# Patient Record
Sex: FEMALE | Race: OTHER | Employment: FULL TIME | URBAN - NONMETROPOLITAN AREA
[De-identification: names, ages, dates, MRNs, and addresses within clinical notes are randomized per-mention and may not be internally consistent; named-entity substitution may affect disease eponyms.]

---

## 2021-02-08 ENCOUNTER — HOSPITAL ENCOUNTER (INPATIENT)
Age: 21
LOS: 4 days | Discharge: HOME | DRG: 881 | End: 2021-02-12
Attending: EMERGENCY MEDICINE | Admitting: PSYCHIATRY & NEUROLOGY
Payer: MEDICAID

## 2021-02-08 DIAGNOSIS — R45.851 SUICIDAL IDEATION: Primary | ICD-10-CM

## 2021-02-08 DIAGNOSIS — F19.10 POLYSUBSTANCE ABUSE (HCC): ICD-10-CM

## 2021-02-08 LAB
ALBUMIN SERPL-MCNC: 4.1 G/DL (ref 3.5–5.2)
ALP BLD-CCNC: 146 U/L (ref 35–104)
ALT SERPL-CCNC: 51 U/L (ref 5–33)
AMPHETAMINE SCREEN, URINE: NEGATIVE
ANION GAP SERPL CALCULATED.3IONS-SCNC: 9 MMOL/L (ref 7–19)
AST SERPL-CCNC: 34 U/L (ref 5–32)
ATYPICAL LYMPHOCYTE RELATIVE PERCENT: 10 % (ref 0–8)
BACTERIA: NEGATIVE /HPF
BARBITURATE SCREEN URINE: NEGATIVE
BASOPHILS ABSOLUTE: 0.1 K/UL (ref 0–0.2)
BASOPHILS RELATIVE PERCENT: 2 % (ref 0–1)
BENZODIAZEPINE SCREEN, URINE: POSITIVE
BILIRUB SERPL-MCNC: 0.3 MG/DL (ref 0.2–1.2)
BILIRUBIN URINE: NEGATIVE
BLOOD, URINE: ABNORMAL
BUN BLDV-MCNC: 10 MG/DL (ref 6–20)
CALCIUM SERPL-MCNC: 8.8 MG/DL (ref 8.6–10)
CANNABINOID SCREEN URINE: POSITIVE
CHLORIDE BLD-SCNC: 104 MMOL/L (ref 98–111)
CLARITY: CLEAR
CO2: 26 MMOL/L (ref 22–29)
COCAINE METABOLITE SCREEN URINE: POSITIVE
COLOR: YELLOW
CREAT SERPL-MCNC: 0.7 MG/DL (ref 0.5–0.9)
CRYSTALS, UA: ABNORMAL /HPF
EOSINOPHILS ABSOLUTE: 0 K/UL (ref 0–0.6)
EOSINOPHILS RELATIVE PERCENT: 0 % (ref 0–5)
EPITHELIAL CELLS, UA: 2 /HPF (ref 0–5)
ETHANOL: <10 MG/DL (ref 0–0.08)
GFR AFRICAN AMERICAN: >59
GFR NON-AFRICAN AMERICAN: >60
GLUCOSE BLD-MCNC: 81 MG/DL (ref 74–109)
GLUCOSE URINE: NEGATIVE MG/DL
HCG(URINE) PREGNANCY TEST: NEGATIVE
HCT VFR BLD CALC: 38.8 % (ref 37–47)
HEMOGLOBIN: 12.9 G/DL (ref 12–16)
HYALINE CASTS: 0 /HPF (ref 0–8)
IMMATURE GRANULOCYTES #: 0 K/UL
KETONES, URINE: NEGATIVE MG/DL
LEUKOCYTE ESTERASE, URINE: ABNORMAL
LYMPHOCYTES ABSOLUTE: 3.3 K/UL (ref 1.1–4.5)
LYMPHOCYTES RELATIVE PERCENT: 44 % (ref 20–40)
Lab: ABNORMAL
MCH RBC QN AUTO: 28.7 PG (ref 27–31)
MCHC RBC AUTO-ENTMCNC: 33.2 G/DL (ref 33–37)
MCV RBC AUTO: 86.2 FL (ref 81–99)
MONOCYTES ABSOLUTE: 0.3 K/UL (ref 0–0.9)
MONOCYTES RELATIVE PERCENT: 5 % (ref 0–10)
NEUTROPHILS ABSOLUTE: 2.4 K/UL (ref 1.5–7.5)
NEUTROPHILS RELATIVE PERCENT: 39 % (ref 50–65)
NITRITE, URINE: NEGATIVE
OPIATE SCREEN URINE: NEGATIVE
PDW BLD-RTO: 13.4 % (ref 11.5–14.5)
PH UA: 7.5 (ref 5–8)
PLATELET # BLD: 254 K/UL (ref 130–400)
PLATELET SLIDE REVIEW: ADEQUATE
PMV BLD AUTO: 9.1 FL (ref 9.4–12.3)
POTASSIUM REFLEX MAGNESIUM: 4.5 MMOL/L (ref 3.5–5)
PROTEIN UA: NEGATIVE MG/DL
RBC # BLD: 4.5 M/UL (ref 4.2–5.4)
RBC # BLD: NORMAL 10*6/UL
RBC UA: 1 /HPF (ref 0–4)
SARS-COV-2, NAAT: NOT DETECTED
SODIUM BLD-SCNC: 139 MMOL/L (ref 136–145)
SPECIFIC GRAVITY UA: 1.02 (ref 1–1.03)
TOTAL PROTEIN: 7.2 G/DL (ref 6.6–8.7)
UROBILINOGEN, URINE: 1 E.U./DL
WBC # BLD: 6.2 K/UL (ref 4.8–10.8)
WBC UA: 1 /HPF (ref 0–5)

## 2021-02-08 PROCEDURE — 99285 EMERGENCY DEPT VISIT HI MDM: CPT

## 2021-02-08 PROCEDURE — 1240000000 HC EMOTIONAL WELLNESS R&B

## 2021-02-08 PROCEDURE — 85025 COMPLETE CBC W/AUTO DIFF WBC: CPT

## 2021-02-08 PROCEDURE — 84703 CHORIONIC GONADOTROPIN ASSAY: CPT

## 2021-02-08 PROCEDURE — U0002 COVID-19 LAB TEST NON-CDC: HCPCS

## 2021-02-08 PROCEDURE — 80307 DRUG TEST PRSMV CHEM ANLYZR: CPT

## 2021-02-08 PROCEDURE — 36415 COLL VENOUS BLD VENIPUNCTURE: CPT

## 2021-02-08 PROCEDURE — 82077 ASSAY SPEC XCP UR&BREATH IA: CPT

## 2021-02-08 PROCEDURE — 6370000000 HC RX 637 (ALT 250 FOR IP): Performed by: PSYCHIATRY & NEUROLOGY

## 2021-02-08 PROCEDURE — 80053 COMPREHEN METABOLIC PANEL: CPT

## 2021-02-08 PROCEDURE — 81003 URINALYSIS AUTO W/O SCOPE: CPT

## 2021-02-08 RX ORDER — MECOBALAMIN 5000 MCG
5 TABLET,DISINTEGRATING ORAL NIGHTLY PRN
Status: DISCONTINUED | OUTPATIENT
Start: 2021-02-08 | End: 2021-02-12 | Stop reason: HOSPADM

## 2021-02-08 RX ORDER — HYDROXYZINE HYDROCHLORIDE 25 MG/1
25 TABLET, FILM COATED ORAL 3 TIMES DAILY PRN
Status: DISCONTINUED | OUTPATIENT
Start: 2021-02-08 | End: 2021-02-09

## 2021-02-08 RX ORDER — TRAZODONE HYDROCHLORIDE 50 MG/1
50 TABLET ORAL NIGHTLY
Status: DISCONTINUED | OUTPATIENT
Start: 2021-02-08 | End: 2021-02-09

## 2021-02-08 RX ORDER — POLYETHYLENE GLYCOL 3350 17 G/17G
17 POWDER, FOR SOLUTION ORAL DAILY PRN
Status: DISCONTINUED | OUTPATIENT
Start: 2021-02-08 | End: 2021-02-12 | Stop reason: HOSPADM

## 2021-02-08 RX ORDER — ACETAMINOPHEN 325 MG/1
650 TABLET ORAL EVERY 4 HOURS PRN
Status: DISCONTINUED | OUTPATIENT
Start: 2021-02-08 | End: 2021-02-12 | Stop reason: HOSPADM

## 2021-02-08 RX ADMIN — TRAZODONE HYDROCHLORIDE 50 MG: 50 TABLET ORAL at 22:32

## 2021-02-08 RX ADMIN — Medication 5 MG: at 22:32

## 2021-02-08 ASSESSMENT — SLEEP AND FATIGUE QUESTIONNAIRES
AVERAGE NUMBER OF SLEEP HOURS: 8
DO YOU USE A SLEEP AID: NO
DO YOU HAVE DIFFICULTY SLEEPING: NO

## 2021-02-09 PROBLEM — F34.9 PERSISTENT MOOD (AFFECTIVE) DISORDER, UNSPECIFIED (HCC): Status: ACTIVE | Noted: 2021-02-09

## 2021-02-09 PROCEDURE — 1240000000 HC EMOTIONAL WELLNESS R&B

## 2021-02-09 PROCEDURE — 6370000000 HC RX 637 (ALT 250 FOR IP): Performed by: PSYCHIATRY & NEUROLOGY

## 2021-02-09 PROCEDURE — 99223 1ST HOSP IP/OBS HIGH 75: CPT | Performed by: PSYCHIATRY & NEUROLOGY

## 2021-02-09 RX ORDER — HYDROXYZINE HYDROCHLORIDE 25 MG/1
25 TABLET, FILM COATED ORAL 3 TIMES DAILY PRN
Status: DISCONTINUED | OUTPATIENT
Start: 2021-02-09 | End: 2021-02-12 | Stop reason: HOSPADM

## 2021-02-09 RX ORDER — ESCITALOPRAM OXALATE 10 MG/1
5 TABLET ORAL DAILY
Status: DISCONTINUED | OUTPATIENT
Start: 2021-02-09 | End: 2021-02-12 | Stop reason: HOSPADM

## 2021-02-09 RX ORDER — TRAZODONE HYDROCHLORIDE 50 MG/1
50 TABLET ORAL NIGHTLY PRN
Status: DISCONTINUED | OUTPATIENT
Start: 2021-02-09 | End: 2021-02-12 | Stop reason: HOSPADM

## 2021-02-09 RX ADMIN — Medication 5 MG: at 21:19

## 2021-02-09 RX ADMIN — TRAZODONE HYDROCHLORIDE 50 MG: 50 TABLET ORAL at 21:19

## 2021-02-09 RX ADMIN — ESCITALOPRAM OXALATE 5 MG: 10 TABLET ORAL at 14:25

## 2021-02-09 NOTE — PLAN OF CARE
Problem: Anxiety:  Goal: Level of anxiety will decrease  2/9/2021 1553 by Catarino Subramanian   Group Therapy Note     Date: 2/9/2021  Start Time: 1430  End Time:  0709  Number of Participants: 10     Type of Group: Psychotherapy     Wellness Binder Information  Module Name:  emotional wellness  Session Number:  5     Patient's Goal:  obstacles to emotional wellness     Notes:  pt acknowledged negative thinking as an obstacle to emotional wellness.      Status After Intervention:  Improved     Participation Level: Interactive     Participation Quality: Appropriate, Attentive, and Sharing        Speech:  normal        Thought Process/Content: Logical        Affective Functioning: Congruent        Mood: congruent        Level of consciousness:  Alert, Oriented x4, and Attentive        Response to Learning: Able to verbalize current knowledge/experience        Endings: None Reported     Modes of Intervention: Education        Discipline Responsible: Psychoeducational Specialist        Signature:  Catarino Subramanian                 Outcome: Ongoing

## 2021-02-09 NOTE — PLAN OF CARE
Problem: Altered Mood, Depressive Behavior:  Goal: Able to verbalize acceptance of life and situations over which he or she has no control  Description: Able to verbalize acceptance of life and situations over which he or she has no control  2/9/2021 1227 by Nilson Moyer  Outcome: Ongoing  Note:                                                                     Group Therapy Note    Date: 2/9/2021  Start Time: 1045  End Time:  1115  Number of Participants: 13    Type of Group: Cognitive Skills    Wellness Binder Information  Module Name:  [address] [place] [address]  Session Number:  1    Group Goal for Pt: To improve knowledge of practical facts about depression    Notes:  Pt demonstrated improved knowledge of practical facts about depression by actively participating in group activity. Status After Intervention:  Unchanged    Participation Level:  Active Listener and Interactive    Participation Quality: Appropriate and Attentive      Speech:  normal      Thought Process/Content: Logical      Affective Functioning: Congruent      Mood: anxious and depressed      Level of consciousness:  Alert and Oriented x4      Response to Learning: Able to verbalize current knowledge/experience, Able to verbalize/acknowledge new learning, and Progressing to goal      Endings: None Reported    Modes of Intervention: Education      Discipline Responsible: Psychoeducational Specialist      Signature:  Nilson Moyer

## 2021-02-09 NOTE — PLAN OF CARE
Problem: Altered Mood, Depressive Behavior:  Goal: Able to verbalize acceptance of life and situations over which he or she has no control  Description: Able to verbalize acceptance of life and situations over which he or she has no control  Outcome: Ongoing  Note:                                                                     Group Therapy Note    Date: 2/9/2021  Start Time: 1000  End Time:  1030  Number of Participants: 12    Type of Group: Psychoeducation    Wellness Binder Information  Module Name:  Men's Issues  Session Number:  1    Group Goal for Pt: To improve knowledge of effective stress management techniques    Notes:  Pt demonstrated improved knowledge of effective stress management techniques by actively participating in group discussion. Status After Intervention:  Unchanged    Participation Level:  Active Listener    Participation Quality: Appropriate and Attentive      Speech:  normal      Thought Process/Content: Logical      Affective Functioning: Congruent      Mood: anxious and depressed      Level of consciousness:  Alert and Oriented x4      Response to Learning: Able to verbalize current knowledge/experience, Able to verbalize/acknowledge new learning, and Progressing to goal      Endings: None Reported    Modes of Intervention: Education      Discipline Responsible: Psychoeducational Specialist      Signature:  Marcel Lopez
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
## 2021-02-09 NOTE — GROUP NOTE
Group Therapy Note    Date: 2/9/2021    Group Start Time: 1600  Group End Time: 1700  Group Topic: Healthy Living/Wellness    [facility] ADULT I    Chace Todd RN                                                                 Group Therapy Note    Date: 3/10/2020  Start Time: 1600  End Time:  1700  Number of Participants: 10    Type of Group: Healthy Living/Wellness    Patient's Goal:  medication education       Status After Intervention:  Unchanged    Participation Level: Active Listener and Interactive    Participation Quality: Appropriate and Attentive      Speech:  normal      Thought Process/Content: Logical      Affective Functioning: Congruent      Mood: anxious      Level of consciousness:  Alert, Oriented x4, and Attentive      Response to Learning: Able to verbalize current knowledge/experience and Able to verbalize/acknowledge new learning      Endings: None Reported    Modes of Intervention: Education      Discipline Responsible: Registered Nurse      Signature:   Chace Todd RN

## 2021-02-09 NOTE — PLAN OF CARE
Problem: Health Maintenance - Impaired:  Goal: Ability to perform activities of daily living will improve  2/9/2021 1606 by Audie Abernathy  Outcome: Ongoing       Group Therapy Note     Date: 2/9/2021  Start Time: 5410  End Time:  1600  Number of Participants: 10     Type of Group: Recovery     Wellness Binder Information  Module Name:  relapse prevention  Session Number:  3     Patient's Goal:  too much stress can lead to relapse     Notes:  pt acknowledged use of positive coping skills to decrease stress and help prevent relapse.      Status After Intervention:  Improved     Participation Level: Interactive     Participation Quality: Appropriate, Attentive, and Sharing        Speech:  normal        Thought Process/Content: Logical        Affective Functioning: Congruent        Mood: congruent        Level of consciousness:  Alert, Oriented x4, and Attentive        Response to Learning: Able to verbalize current knowledge/experience        Endings: None Reported     Modes of Intervention: Education        Discipline Responsible: Psychoeducational Specialist        Signature:  Audie Abernathy

## 2021-02-09 NOTE — PROGRESS NOTES
Group Therapy Note    Start Time: 800  End Time:  864  Number of Participants: 15    Type of Group: Community Meeting       Patient's Goal:  \" Talking \"      Notes:        Participation Level:  Active Listener       Participation Quality: Appropriate      Thought Process/Content: Logical      Affective Functioning: Congruent      Mood: Calm      Level of consciousness:  Alert      Modes of Intervention: Support      Discipline Responsible: Behavioral Health Tech II      Signature:  Pamela Wiley

## 2021-02-09 NOTE — PROGRESS NOTES
[facility] ADULT INITIAL INTAKE ASSESSMENT     2/8/21    Leslye Steinberg ,a 22 y.o. female, presents to the ED for a psychiatric assessment. ED Arrival time: 1800  ED physician: [address]  [facility] Notification time:   [facility] Assessment start time:   Psychiatrist call time:   Spoke with Dr. Awa Gil    Patient is referred by: car    Reason for visit to ED - Presenting problem:     PT states reason for ED visit, \"im feeling down lately. I have been to therapy twice. She thinks I have a eating disorder. Pt says she eats and then throws up.pt says she weighs herself evry hour. Pt says her main stressor is her weight. My job is really hard and I hate it. I work nights, so im not able to sleep. Pt says she is suicidal with thoughts of overdose. Pt denies being on any medications. I live with my aunt , my mother kicked me out. she picks men over her children. im really insignificant in this world. Pt denies past psych history. Pt denies avh, hi. Pt says she Is using xanax and adderall that isnt prescribed to her.      Duration of symptoms: month     Current Stressors: family    C-SSRS Completed: yes    SI:  has   Plan: yes   Past SI attempts: no   If yes, when and how many times:  Describe suicide attempts:   HI: denies  If yes describe:   Delusions: denies  If yes describe:   Hallucinations: denies   If yes describe:   Risk of Harm to self: Self injurious/self mutilation behaviorsno   If yes explain:   Was it within the past 6 months: no   Risk of Harm to others: no   If yes explain:   Was it within the past 6 months: no   Trauma History:  Anxiety 1-10:  10  Explain if increased:   Depression 1-10:  8  Explain if increased:   Level of function outside hospital decreased: no   If yes explain:       Psychiatric Hospitalizations: No   Where & When:   Outpatient Psychiatric Treatment:    Family History:    Family history of mental illness: no   \"Depression\",\"Anxiety\",\"Bipolar\",\"Schizophrenia\",\"Borderline\",\"ADHD\"} Family members with suicide attempt: no   If yes explain (attempted or completed):    Substance Abuse History:     SBIRT Completed: yes  Brief Intervention completed if needed:  (Yes/No)    Current ETOH LEVELS: <10    ETOH Usage:     Amount drinking daily: occasional, social use    Date of last drink:   Longest period of sobriety:    Substance/Chemical Abuse/Recreational Drug History:  Substance used: marijuana  Date of last substance use: today  Tobacco Use: no   History of rehab treatment:  How many times in rehab:  Last time in rehab:  Family history of substance abuse:    Opiates: It was discussed with pt they would not be receiving opiates unless they were within 3 days post surgery/acute injury. Patient voiced understanding and agreed. Psychiatric Review Of Systems:     Recent Sleep changes: yes   Recent appetite changes: no   Recent weight changes/Pounds gained (+) or lost (-): no      Medical History:     Medical Diagnosis/Issues:   CT today in ED:no  Use of 02 or CPAP: no  Ambulatory: yes  Independent or Need assistance with Self Care:     PCP: No primary care provider on file. Current Medications:   Scheduled Meds: No current facility-administered medications for this encounter. No current outpatient medications on file. Mental Status Evaluation:     Appearance:  age appropriate   Behavior:  Within Normal Limits   Speech:  normal volume   Mood:  anxious   Affect:  normal   Thought Process:  within normal limits   Thought Content:  suicidal   Sensorium:  person, place and time/date   Cognition:  grossly intact   Insight:  good       Collateral Information:     Name:   Relationship:   Phone Number:   Collateral: refused    Current living arrangement: with aunt   Current Support System: friends   Employment: [employer] in [employer]     Disposition:     Choose one of the options below for disposition:     1.  Decision to admit to [facility]    If yes, which unit Adult or Geriatric Unit:  Adult Is patient voluntary: yes  If no has a 72 hold been initiated:   Admission Diagnosis: SI    Does the patient have a guardian or Medical POA:   Has the guardian been notified or Medical POA:       2. Decision to Discharge:   Does not meet criteria for acceptance to   unit due to:     3. Transferred:       Patient was transferred due to:      Other follow up information provided:      Pretty Hurt RN

## 2021-02-09 NOTE — PROGRESS NOTES
[facility] Admission From ED  Nursing Admission Note    Admission Type: Voluntary    Reason for Admission: Tanya Garcia 21 presents to ED si with plan to overdose. pt also says she has an eating disorder    There is no problem list on file for this patient. Pt admitted from Dr. Chalo Pardo care in ED to [address] [facility] room 0601/601-01. Arrived on unit via [facility] with RN and security  escort. Body assessment completed by Katie Ramirez with no contraband discovered. All tubes, lines, and drains were appropriately discontinued by ED staff prior to pt transfer to [facility]. Pt belongings and valuables inventoried and cataloged, stored per policy. Pt oriented to surroundings, program expectations, and copy pt rights given. Received admit packet: [address], Visitation Info, Fall Prevention, Restraints Info. Consents reviewed, signed Pt Rights, Handbook Acceptance, Visit/Call Acceptance, PHI Release, Social Info Release, and Treatment Agreement. Pt verbalizes understanding. Pt is a smoker? no Pt offered Nicotine patch yes,   Pt refused Nicotine patch? yes Patient provided education on Covid-19 and the importance of reporting any respiratory symptoms, headache, body aches or cough to the nursing staff as well as  frequent hand washing, social distancing and wearing a mask while on the unit? yes,  patient provided mask? yes,  patient refused mask? no Patient verbalized understanding? yes,     Identifies stressors. Eating disorder. Family problems. Pt stated she lived with an Aunt but said she is just one of her mother's friends,she does not know where her mother is and does not know her father. Worries about weight it causes her stress.     Status and Exam  Normal: Yes  Affect: Appropriate  Level of Consciousness: Alert  Mood:Normal: No  Mood: Depressed, Anxious  Motor Activity:Normal: Yes  Interview Behavior: Cooperative  Attention:Normal: Yes  Thought Content:Normal: Yes  Hallucinations: None  Delusions: No  Memory:Normal: Yes

## 2021-02-09 NOTE — ED NOTES
[facility] at pt bedside for mental health evaluation.      Seb Mckeon, [place]  02/08/21 1950

## 2021-02-09 NOTE — PROGRESS NOTES
Requirement Note     SW met with pt to complete Psychosocial and CSSR-S on this date. Patients long and short term goals discussed. Patient voiced understanding. Treatment plan sheet signed. Patient verbalized understanding of the treatment plan. Patient participated in goals and objectives of the treatment plan. Patient completed safety plan with , patient received copy of plan, and original was placed into patient's chart. SW explained treatment goals with pt. Decreasing depression and anxiety by improvement of positive coping patterns was discussed. Pt acknowledged understanding of treatment goals and signed treatment plan signature sheet. In the last 6 months has the pt been a danger to self: YES  In the last 6 months has the pt been a danger to others: NO  Legal Guardian/POA: NO     Provided patient with [employer] handout entitled \"Quitting Smoking. \"  Reviewed handout with patient: addressing dangers of smoking, developing coping skills, and providing basic information about quitting. Patient received all components practical counseling of tobacco practical counseling during the hospital stay.

## 2021-02-09 NOTE — ED PROVIDER NOTES
[facility]  eMERGENCY dEPARTMENT eNCOUnter      Pt Name: Frank George  MRN: [id number]  [place] 2000  Date of evaluation: 2/8/2021  Provider: Kavon Jain MD    CHIEF COMPLAINT       Chief Complaint   Patient presents with    Suicidal     presents to ED with c/o SI without a plan sent by PCP. HISTORY OF PRESENT ILLNESS   (Location/Symptom, Timing/Onset,Context/Setting, Quality, Duration, Modifying Factors, Severity)  Note limiting factors. Frank George is a 22 y.o. female who presents to the emergency department      The history is provided by the patient. Mental Health Problem  Presenting symptoms: depression and suicidal thoughts (plan of OD)    Patient accompanied by: no one. Duration:  1 month  Timing:  Constant  Progression:  Unchanged  Chronicity:  New  Context: not alcohol use, not drug abuse and not stressful life event    Treatment compliance:  Untreated  Risk factors: no hx of mental illness and no hx of suicide attempts        NursingNotes were reviewed. REVIEW OF SYSTEMS    (2-9 systems for level 4, 10 or more for level 5)     Review of Systems   Psychiatric/Behavioral: Positive for suicidal ideas (plan of OD). All other systems reviewed and are negative. Except as noted above the remainder of the review of systems was reviewed and negative. PAST MEDICAL HISTORY   No past medical history on file. SURGICALHISTORY     No past surgical history on file. CURRENT MEDICATIONS       Previous Medications    No medications on file       ALLERGIES     Patient has no known allergies. FAMILY HISTORY     No family history on file.        SOCIAL HISTORY       Social History     Socioeconomic History    Marital status: Single     Spouse name: Not on file    Number of children: Not on file    Years of education: Not on file    Highest education level: Not on file   Occupational History    Not on file   Social Needs  Financial resource strain: Not on file    Food insecurity     Worry: Not on file     Inability: Not on file   [employer] needs     Medical: Not on file     Non-medical: Not on file   Tobacco Use    Smoking status: Not on file   Substance and Sexual Activity    Alcohol use: Not on file    Drug use: Not on file    Sexual activity: Not on file   Lifestyle    Physical activity     Days per week: Not on file     Minutes per session: Not on file    Stress: Not on file   Relationships    Social connections     Talks on phone: Not on file     Gets together: Not on file     Attends Jain service: Not on file     Active member of club or organization: Not on file     Attends meetings of clubs or organizations: Not on file     Relationship status: Not on file    Intimate partner violence     Fear of current or ex partner: Not on file     Emotionally abused: Not on file     Physically abused: Not on file     Forced sexual activity: Not on file   Other Topics Concern    Not on file   Social History Narrative    Not on file       SCREENINGS      @BIND([id number])@      PHYSICAL EXAM    (up to 7 for level 4, 8 or more for level 5)     ED Triage Vitals [02/08/21 1813]   BP Temp Temp Source Pulse Resp SpO2 Height Weight   132/77 98.2 °F (36.8 °C) Oral 90 16 99 % 5' 6\" (1.676 m) 166 lb (75.3 kg)       Physical Exam  Vitals signs and nursing note reviewed. Constitutional:       General: She is not in acute distress. Appearance: Normal appearance. She is normal weight. She is not ill-appearing, toxic-appearing or diaphoretic. HENT:      Head: Atraumatic. Nose: Nose normal.      Mouth/Throat:      Pharynx: Oropharynx is clear. Eyes:      Conjunctiva/sclera: Conjunctivae normal.   Neck:      Musculoskeletal: Normal range of motion and neck supple. Cardiovascular:      Rate and Rhythm: Normal rate and regular rhythm. Heart sounds: Normal heart sounds.    Pulmonary: Effort: Pulmonary effort is normal.      Breath sounds: Normal breath sounds. Musculoskeletal:      Right lower leg: No edema. Left lower leg: No edema. Skin:     General: Skin is warm and dry. Neurological:      General: No focal deficit present. Mental Status: She is alert and oriented to person, place, and time. Cranial Nerves: No cranial nerve deficit.    Psychiatric:         Mood and Affect: Mood normal.         Behavior: Behavior normal.         DIAGNOSTIC RESULTS     EKG: All EKG's are interpreted by the Emergency Department Physician who either signs or Co-signsthis chart in the absence of a cardiologist.        RADIOLOGY:   Creed Hawking such as CT, Ultrasound and MRI are read by the radiologist. Plain radiographic images are visualized and preliminarily interpreted by the emergency physician with the below findings:        Interpretation per the Radiologist below, if available at the time ofthis note:    No orders to display         ED BEDSIDE ULTRASOUND:   Performed by ED Physician - none    LABS:  Labs Reviewed   CBC WITH AUTO DIFFERENTIAL - Abnormal; Notable for the following components:       Result Value    MPV 9.1 (*)     Neutrophils % 39.0 (*)     Lymphocytes % 44.0 (*)     Basophils % 2.0 (*)     Atypical Lymphocytes Relative 10 (*)     All other components within normal limits   COMPREHENSIVE METABOLIC PANEL W/ REFLEX TO MG FOR LOW K - Abnormal; Notable for the following components:    Alkaline Phosphatase 146 (*)     ALT 51 (*)     AST 34 (*)     All other components within normal limits   URINE RT REFLEX TO CULTURE - Abnormal; Notable for the following components:    Blood, Urine MODERATE (*)     Leukocyte Esterase, Urine TRACE (*)     All other components within normal limits   URINE DRUG SCREEN - Abnormal; Notable for the following components:    Benzodiazepine Screen, Urine Positive (*)     Cannabinoid Scrn, Ur Positive (*) Cocaine Metabolite Screen, Urine Positive (*)     All other components within normal limits   MICROSCOPIC URINALYSIS - Abnormal; Notable for the following components:    Bacteria, UA NEGATIVE (*)     Crystals, UA NEG (*)     All other components within normal limits   ETHANOL   PREGNANCY, URINE   COVID-19       All other labs were within normal range or not returned as of this dictation. EMERGENCY DEPARTMENT COURSE and DIFFERENTIAL DIAGNOSIS/MDM:   Vitals:    Vitals:    02/08/21 1813   BP: 132/77   Pulse: 90   Resp: 16   Temp: 98.2 °F (36.8 °C)   TempSrc: Oral   SpO2: 99%   Weight: 166 lb (75.3 kg)   Height: 5' 6\" (1.676 m)           MDM  Number of Diagnoses or Management Options  Polysubstance abuse ([address]  Suicidal ideation  Diagnosis management comments: Psych sees/accepts. CRITICAL CARE TIME   Total Critical Care time was 0 minutes, excluding separately reportable procedures. There was a high probability of clinically significant/lifethreatening deterioration in the patient's condition which required my urgent intervention. CONSULTS:  None    PROCEDURES:  Unless otherwise noted below, none     Procedures    FINAL IMPRESSION      1. Suicidal ideation    2. Polysubstance abuse ([address]          DISPOSITION/PLAN   DISPOSITION        PATIENT REFERRED TO:  No follow-up provider specified.     DISCHARGE MEDICATIONS:  New Prescriptions    No medications on file          (Please note that portions of this note were completed with a voice recognition program.  Efforts were made to edit the dictations but occasionally words are mis-transcribed.)    Osmar Hearn MD (electronically signed)  Attending Emergency Physician          Omsar Hearn MD  02/08/21 8634

## 2021-02-09 NOTE — SUICIDE SAFETY PLAN
SAFETY PLAN    A suicide Safety Plan is a document that supports someone when they are having thoughts of suicide. Warning Signs that indicate a suicidal crisis may be developing: What (situations, thoughts, feelings, body sensations, behaviors, etc.) do you experience that lets you know you are beginning to think about suicide? 1. Taking a lot of xanax  2. More anxious about food  3. shaking    Internal Coping Strategies:  What things can I do (relaxation techniques, hobbies, physical activities, etc.) to take my mind off my problems without contacting another person? 1. Watching tv   2. Listening to music  3. Personal work outs    People and social settings that provide distraction: Who can I call or where can I go to distract me? 1. Name: Tessy Pineda  Phone: [phone]  2. Name: Sera Reagan  Phone:     3. Place: my job [place]ella            4. Place:      People whom I can ask for help: Who can I call when I need help - for example, friends, family, clergy, someone else? 1. Name: Mimi Granados                Phone: [phone]  2. Name: Sera Reagan  Phone:    3. [facility]  Phone:       Professionals or [address] [place], [place] [address]vd I can contact during a crisis: Who can I call for help - for example, my doctor, my psychiatrist, my psychologist, a mental health provider, a suicide hotline? 1. Clinician Name: [id number]   Phone: [phone]      Clinician Pager or Emergency Contact #: [phone]    4. Clinician Name: [address]   Phone: [phone]      Clinician Pager or Emergency Contact #: [phone]    8. Suicide Prevention Lifeline: 0-449-048-TALK (6696)    4.  [address] [place] [address] Emergency Services -  for example, [address] suicide hotline, [facility] Hotline: 211      Emergency Services Address: [facility] [address]      Emergency Services Phone: [phone] Making the environment safe: How can I make my environment (house/apartment/living space) safer? For example, can I remove guns, medications, and other items? 1. Remove weapons from the home  2.  Remove extra medication from the home      The One thing that is most important to me and worth living for is:    My Aunt

## 2021-02-09 NOTE — PLAN OF CARE
Problem: Anxiety:  Goal: Level of anxiety will decrease  Description: Level of anxiety will decrease  Outcome: Ongoing     Problem: Discharge Planning:  Goal: Discharged to appropriate level of care  Description: Discharged to appropriate level of care  Outcome: Ongoing     Problem: Health Maintenance - Impaired:  Goal: Ability to perform activities of daily living will improve  Description: Ability to perform activities of daily living will improve  Outcome: Ongoing  Goal: Able to sleep without medication for appropriate length of time  Description: Able to sleep without medication for appropriate length of time  Outcome: Ongoing  Goal: Maintenance of adequate nutrition will improve  Description: Maintenance of adequate nutrition will improve  Outcome: Ongoing     Problem: Mood - Altered:  Goal: Mood stable  Description: Mood stable  Outcome: Ongoing     Problem: Self-Esteem - Low:  Goal: Demonstrates positive self-esteem  Description: Demonstrates positive self-esteem  Outcome: Ongoing     Problem: Cerebrospinal Fluid Leakage - Risk Of:  Goal: Demonstration of organized thought processes  Description: Demonstration of organized thought processes  Outcome: Ongoing     Problem: Violence - Risk of, Self/Other-Directed:  Goal: Knowledge of developmental care interventions  Description: Absence of violence  Outcome: Ongoing

## 2021-02-10 LAB
ALBUMIN SERPL-MCNC: 4.1 G/DL (ref 3.5–5.2)
ALP BLD-CCNC: 116 U/L (ref 35–104)
ALT SERPL-CCNC: 36 U/L (ref 5–33)
ANION GAP SERPL CALCULATED.3IONS-SCNC: 7 MMOL/L (ref 7–19)
AST SERPL-CCNC: 26 U/L (ref 5–32)
BILIRUB SERPL-MCNC: 0.5 MG/DL (ref 0.2–1.2)
BUN BLDV-MCNC: 7 MG/DL (ref 6–20)
CALCIUM SERPL-MCNC: 8.9 MG/DL (ref 8.6–10)
CHLORIDE BLD-SCNC: 107 MMOL/L (ref 98–111)
CHOLESTEROL, TOTAL: 169 MG/DL (ref 160–199)
CO2: 26 MMOL/L (ref 22–29)
CREAT SERPL-MCNC: 0.5 MG/DL (ref 0.5–0.9)
GFR AFRICAN AMERICAN: >59
GFR NON-AFRICAN AMERICAN: >60
GLUCOSE BLD-MCNC: 81 MG/DL (ref 74–109)
HBA1C MFR BLD: 4.7 % (ref 4–6)
HDLC SERPL-MCNC: 35 MG/DL (ref 65–121)
LDL CHOLESTEROL CALCULATED: 100 MG/DL
POTASSIUM SERPL-SCNC: 4.1 MMOL/L (ref 3.5–5)
SODIUM BLD-SCNC: 140 MMOL/L (ref 136–145)
TOTAL PROTEIN: 6.9 G/DL (ref 6.6–8.7)
TRIGL SERPL-MCNC: 169 MG/DL (ref 0–149)
TSH REFLEX FT4: 0.89 UIU/ML (ref 0.35–5.5)
VITAMIN B-12: 447 PG/ML (ref 211–946)
VITAMIN D 25-HYDROXY: 7.2 NG/ML

## 2021-02-10 PROCEDURE — 99232 SBSQ HOSP IP/OBS MODERATE 35: CPT | Performed by: PSYCHIATRY & NEUROLOGY

## 2021-02-10 PROCEDURE — 1240000000 HC EMOTIONAL WELLNESS R&B

## 2021-02-10 PROCEDURE — 82306 VITAMIN D 25 HYDROXY: CPT

## 2021-02-10 PROCEDURE — 6370000000 HC RX 637 (ALT 250 FOR IP): Performed by: PSYCHIATRY & NEUROLOGY

## 2021-02-10 PROCEDURE — 36415 COLL VENOUS BLD VENIPUNCTURE: CPT

## 2021-02-10 PROCEDURE — 82607 VITAMIN B-12: CPT

## 2021-02-10 PROCEDURE — 80053 COMPREHEN METABOLIC PANEL: CPT

## 2021-02-10 PROCEDURE — 84443 ASSAY THYROID STIM HORMONE: CPT

## 2021-02-10 PROCEDURE — 83036 HEMOGLOBIN GLYCOSYLATED A1C: CPT

## 2021-02-10 PROCEDURE — 80061 LIPID PANEL: CPT

## 2021-02-10 RX ORDER — CEFDINIR 300 MG/1
300 CAPSULE ORAL 2 TIMES DAILY
Status: ON HOLD | COMMUNITY
End: 2021-02-12 | Stop reason: HOSPADM

## 2021-02-10 RX ORDER — ERGOCALCIFEROL 1.25 MG/1
50000 CAPSULE ORAL WEEKLY
Status: DISCONTINUED | OUTPATIENT
Start: 2021-02-10 | End: 2021-02-12 | Stop reason: HOSPADM

## 2021-02-10 RX ADMIN — ESCITALOPRAM OXALATE 5 MG: 10 TABLET ORAL at 08:51

## 2021-02-10 RX ADMIN — HYDROXYZINE HYDROCHLORIDE 25 MG: 25 TABLET, FILM COATED ORAL at 21:50

## 2021-02-10 RX ADMIN — TRAZODONE HYDROCHLORIDE 50 MG: 50 TABLET ORAL at 21:50

## 2021-02-10 RX ADMIN — ERGOCALCIFEROL 50000 UNITS: 1.25 CAPSULE ORAL at 10:17

## 2021-02-10 RX ADMIN — ACETAMINOPHEN 650 MG: 325 TABLET ORAL at 10:48

## 2021-02-10 ASSESSMENT — PAIN SCALES - GENERAL
PAINLEVEL_OUTOF10: 4
PAINLEVEL_OUTOF10: 1

## 2021-02-10 ASSESSMENT — PAIN - FUNCTIONAL ASSESSMENT: PAIN_FUNCTIONAL_ASSESSMENT: ACTIVITIES ARE NOT PREVENTED

## 2021-02-10 NOTE — BH NOTE
Group Therapy Note    Date: 2/10/2021  Start Time: 1330  End Time:  1400  Number of Participants: 11    Type of Group: Spirituality     Wellness Binder Information  Module Name:  Mindfulness  Session Number:      Patient's Goal:  Skills in meditation and relaxation    Notes:      Status After Intervention:  Improved    Participation Level:  Active Listener and Interactive    Participation Quality: Appropriate, Attentive and Sharing      Speech:  normal      Thought Process/Content:       Affective Functioning: Congruent      Mood: euthymic, calm      Level of consciousness:  Oriented x4 and Attentive      Response to Learning: Able to verbalize current knowledge/experience and Capable of insight      Endings:     Modes of Intervention: Education, Socialization and Activity      Discipline Responsible:       Signature:  Loretta Stone  [facility]

## 2021-02-10 NOTE — PLAN OF CARE
Problem: Health Maintenance - Impaired:  Goal: Ability to perform activities of daily living will improve  Description: Ability to perform activities of daily living will improve  Outcome: Ongoing                                                                       Group Therapy Note    Date: 2/10/2021  Start Time: 1000  End Time:  2175  Number of Participants: 11    Type of Group: Psychotherapy    Patient's Goal: Patient will process emotions and actions and how to relate to other or their with others. Patient discussed open communication and feelings and emotions. Notes:  Patient attended process group as scheduled, patient identified a issue to work on today regarding how they will interact and relate to others. Status After Intervention:  Improved    Participation Level:  Active Listener    Participation Quality: Appropriate, Attentive, and Sharing      Speech:  normal      Thought Process/Content: Logical      Affective Functioning: Congruent      Mood: euthymic      Level of consciousness:  Alert      Response to Learning: Able to verbalize current knowledge/experience      Endings: None Reported    Modes of Intervention: Education, Support, and Socialization      Discipline Responsible: /Counselor      Signature:  Stephanie Banda, [facility]

## 2021-02-10 NOTE — PROGRESS NOTES
Group Therapy Note    Start Time: 800  End Time:  728  Number of Participants: 10    Type of Group: Community Meeting       Patient's Goal:  \"anxiety\"      Notes:      Participation Level:  Active Listener       Participation Quality: Appropriate      Thought Process/Content: Logical      Affective Functioning: Congruent      Mood: calm      Level of consciousness:  Alert      Modes of Intervention: Support      Discipline Responsible: Behavioral Health Tech II      Signature:  Lencho Cobian

## 2021-02-10 NOTE — PROGRESS NOTES
BHI Daily Shift Assessment  Nursing Progress Note    [address] [facility] "[employer]    Room: 601  Name: Anil Campo  Age: 22   Gender: F   Dx: Suicidal thought  Precautions: Suicide  Target Symptoms: suicidal thought   Accu-Chek: no  Sleep Quality: 7 hrs  SI: no  HI: no  AVH: no  ADLs: yes  Speech: clear  Depression: 0  Anxiety: 0  Participation Quality: good  Appetite: 75%  Respiratory symptoms: none  Headache: none  Body aches: none  Fever: none  Cough: none  Patients encouraged to wear masks / wash hands frequently / practice social distancing: yes  Visitation: n/a  Complaints: none    Notes: Patient calm and cooperative, appearance clean, thought organized, alert/oriented, activities and self care done.     Signature: Sánchez Vargas RN

## 2021-02-10 NOTE — PROGRESS NOTES
Department of Psychiatry  Attending Progress Note     Chief complaint: \"I'm better\"    SUBJECTIVE:   Chart reviewed, discussed with the team.  No major issues overnight. Patient isolates to self. She went to the afternoon group but did not participate. No issues with sleep and appetite. Collateral obtained from mother who states patient has been drinking since November. She had marijuana possession charge which was dismissed. She recently changed the beneficiary of her life insurance from her mother to aunt. Patient is observed in her room this morning. Presents with brighter affect. Smiled. More talkative. States she is doing better. Denies suicidal ideation. Reports good sleep and appetite. No side effects from medication so far. Admits to drinking alcohol with friends and \"occasionally using cocaine and smoking marijuana daily\". States at times she also takes Xanax. \"I get high with some friends. \"  Provider counseled her on the harmful effects of substances on her physical and mental health. Discussed elevated liver enzymes. Patient states \"I am not addicted. I can stop doing it anytime. I know it's bad for me. \"  The writer encouraged her to be more open about her issues. Reassured her that all the information remains confidential.  Lupe Mejias over the coping skills. Encouraged participation in group therapy and recreational activities. OBJECTIVE    Physical  Wt Readings from Last 3 Encounters:   02/08/21 164 lb 9.6 oz (74.7 kg)     Temp Readings from Last 3 Encounters:   02/09/21 97.3 °F (36.3 °C) (Temporal)     BP Readings from Last 3 Encounters:   02/09/21 (!) 124/58     Pulse Readings from Last 3 Encounters:   02/09/21 72        Review of Systems: 14-point review of systems negative except as described above    Mental Status Examination:   Appearance: Stated age. Long hair, glasses. Gait stable. No abnormal movements or tremor.   Behavior: Calm, cooperative, smiled Speech: Normal in tone, volume, and quality. No slurring, dysarthria or pressured speech noted. Mood: \"Better \"   Affect: Mood congruent. Thought Process: Appears linear. Thought Content: Denies suicidal or homicidal ideation. No overt delusions or paranoia appreciated. Perceptions: Denies auditory or visual hallucinations at present time. Not responding to internal stimuli. Concentration: Intact. Orientation: to person, place, date, and situation. Language: Intact. Fund of information: Intact. Memory: Recent and remote appear intact. Neurovegitative: Fair appetite and sleep. Insight: Limited. Judgment: Limited.     Data  Lab Results   Component Value Date    WBC 6.2 02/08/2021    HGB 12.9 02/08/2021    HCT 38.8 02/08/2021    MCV 86.2 02/08/2021     02/08/2021      Lab Results   Component Value Date     02/10/2021    K 4.1 02/10/2021     02/10/2021    CO2 26 02/10/2021    BUN 7 02/10/2021    CREATININE 0.5 02/10/2021    GLUCOSE 81 02/10/2021    CALCIUM 8.9 02/10/2021    PROT 6.9 02/10/2021    LABALBU 4.1 02/10/2021    BILITOT 0.5 02/10/2021    ALKPHOS 116 (H) 02/10/2021    AST 26 02/10/2021    ALT 36 (H) 02/10/2021    LABGLOM >60 02/10/2021    GFRAA >59 02/10/2021       Medications    Current Facility-Administered Medications:     vitamin D (ERGOCALCIFEROL) capsule 50,000 Units, 50,000 Units, Oral, Weekly, Alvarez Alcantara MD    escitalopram (LEXAPRO) tablet 5 mg, 5 mg, Oral, Daily, Alvarez Alcantara MD, 5 mg at 02/10/21 0851    traZODone (DESYREL) tablet 50 mg, 50 mg, Oral, Nightly PRN, Alvarez Alcantara MD, 50 mg at 02/09/21 2119    hydrOXYzine (ATARAX) tablet 25 mg, 25 mg, Oral, TID PRN, Alvarez Alcantara MD    acetaminophen (TYLENOL) tablet 650 mg, 650 mg, Oral, Q4H PRN, Jatin Aaron MD    polyethylene glycol [facility]) packet 17 g, 17 g, Oral, Daily PRN, Jatin Aaron MD   melatonin disintegrating tablet 5 mg, 5 mg, Oral, Nightly PRN, Sandeep Dykes MD, 5 mg at 02/09/21 2111    ASSESSMENT AND PLAN  DSM 5 DIAGNOSIS  Impression  Social anxiety disorder  Eating disorder unspecified   Cluster B traits  Alcohol use disorder  Stimulant use disorder  Cannabis use disorder  Sedative use disorder  Transaminitis  Elevated alkaline phosphatase  Vitamin D deficiency    Brighter affect. Insight remains poor. Continue to observe. Plan:   1. Psychiatric Medications:   Continue current psychotropic medications as recommended. Monitor for side effects. The risks, benefits, side effects, indications, contraindications, alternatives and adverse effects of the medications have been discussed with patient. 2. Continue to provide supportive psychotherapy. Encourage socialization and participation in recreational activities. Work on coping skills. 3. Medical Issues:    Continue medical monitoring by Dr. Jaquelin Delacruz and associates. Vitamin supplementation. Monitor liver function. 4. Disposition:     to provide outpatient resources and facilitate disposition.      Amount of time spent with patient:      35 minutes with greater than 50 % of the time spent in counseling and collaboration of care

## 2021-02-10 NOTE — H&P
HISTORY and PHYSICAL      CHIEF COMPLAINT:  Depression, SI    Reason for Admission:  Depression, SI    History Obtained From:  Patient, chart    HISTORY OF PRESENT ILLNESS:      The patient is a 22 y.o. female who is admitted to the [place] [address] unit with worsening mood issues. She has no c/o CP or SOA. No abdominal pain or N/V. No dysuria. No new pain issues. No fevers. Past Medical History:    No past medical history on file. Past Surgical History:    No past surgical history on file. Medications Prior to Admission:    No medications prior to admission. Allergies:  Patient has no known allergies. Social History:   TOBACCO:   has no history on file for tobacco.  ETOH:   has no history on file for alcohol. DRUGS:   has no history on file for drug. MARITAL STATUS:  single  OCCUPATION:  Working at [employer]  Patient currently lives with family       Family History:   No family history on file. REVIEW OF SYSTEMS:  Constitutional: neg  CV: neg  Pulmonary: neg  GI: neg  : neg  Psych: depression, SI  Neuro: neg  Skin: neg  MusculoSkeletal: neg  HEENT: neg  Joints: neg    Vitals:  BP (!) 124/58   Pulse 72   Temp 97.3 °F (36.3 °C) (Temporal)   Resp 16   Ht 5' 6\" (1.676 m)   Wt 164 lb 9.6 oz (74.7 kg)   SpO2 98%   BMI 26.57 kg/m²     PHYSICAL EXAM:  Gen: NAD, alert  HEENT: WNL  Lymph: no LAD  Neck: no JVD or masses  Chest: CTA bilat  CV: RRR  Abdomen: NT/ND  Extrem: no C/C/E  Neuro: non focal  Skin: no rashes  Joints: no redness    DATA:  I have reviewed the admission labs and imaging tests.     ASSESSMENT AND PLAN:      Active Problems:    Persistent mood (affective) disorder, unspecified, SI--follow with Psych    Polysubstance Abuse    Elevated LFT's      Annika Ahumada MD  10:23 PM 2/9/2021

## 2021-02-10 NOTE — PLAN OF CARE
Problem: Anxiety:  Goal: Level of anxiety will decrease  Outcome: Ongoing      Group Therapy Note     Date: 2/10/2021  Start Time: 1100  End Time:  8018  Number of Participants: 9     Type of Group: Psychoeducation     Wellness Binder Information  Module Name:  Emotional wellness  Session Number:  1     Patient's Goal:  validation of feelings     Notes:  pt acknowledged to have feelings validated it may be necessary to share feelings with others.      Status After Intervention:  Improved     Participation Level: Interactive     Participation Quality: Appropriate, Attentive, and Sharing        Speech:  normal        Thought Process/Content: Logical        Affective Functioning: Congruent        Mood: congruent        Level of consciousness:  Alert, Oriented x4, and Attentive        Response to Learning: Able to verbalize current knowledge/experience        Endings: None Reported     Modes of Intervention: Education        Discipline Responsible: Psychoeducational Specialist        Signature:  Stephanie Martel

## 2021-02-10 NOTE — PROGRESS NOTES
[facility] Adult Unit Daily Assessment  Nursing Progress Note    Room: [facility]   Name: Jessica Almanza   Age: 22 y.o. Gender: female   Dx: <principal problem not specified>  Precautions: suicide risk  Inpatient Status: voluntary       SLEEP:    Sleep Quality Good  Sleep Medications: Yes   PRN Sleep Meds: Yes       MEDICAL:    Other PRN Meds: No   Med Compliant: Yes  Accu-Chek: No  Oxygen/CPAP/BiPAP: No  CIWA/CINA: No   PAIN Assessment: none  Side Effects from medication: No    Is Patient experiencing any respiratory symptoms (headache, fever, body aches, cough. Davonte Angry ): no  Patient educated by nursing to practice social distancing, wear masks, wash hands frequently: yes      PSYCH:    Depression: 2   Anxiety: 5   SI denies suicidal ideation   HI Negative for homicidal ideation      AVH:Absent      GENERAL:    Appetite: good    Social: No   Speech: normal   Appearance: appropriately dressed and healthy looking    GROUP:    Group Participation: No  Participation Quality: None    Notes: Patient is noted to be withdrawn and has a flat effect. Poor eye contact. Patient stated that she does not like her job. Patient isolated herself in her room. Continue to monitor.          Electronically signed by Jarek Alegria RN on 2/10/21 at 2:57 AM CST

## 2021-02-10 NOTE — PLAN OF CARE
Problem: Anxiety:  Goal: Level of anxiety will decrease  Description: Level of anxiety will decrease  2/10/2021 1308 by Fransisco Kulkarni RN  Outcome: Ongoing  2/10/2021 1156 by Delores Beverly  Outcome: Ongoing

## 2021-02-11 PROCEDURE — 99233 SBSQ HOSP IP/OBS HIGH 50: CPT | Performed by: PSYCHIATRY & NEUROLOGY

## 2021-02-11 PROCEDURE — 6370000000 HC RX 637 (ALT 250 FOR IP): Performed by: PSYCHIATRY & NEUROLOGY

## 2021-02-11 PROCEDURE — 1240000000 HC EMOTIONAL WELLNESS R&B

## 2021-02-11 RX ADMIN — Medication 5 MG: at 21:07

## 2021-02-11 RX ADMIN — ESCITALOPRAM OXALATE 5 MG: 10 TABLET ORAL at 08:01

## 2021-02-11 RX ADMIN — TRAZODONE HYDROCHLORIDE 50 MG: 50 TABLET ORAL at 21:07

## 2021-02-11 NOTE — PROGRESS NOTES
[facility] Adult Unit Daily Assessment  Nursing Progress Note    Room: [facility]601-   Name: Viry Cox   Age: 22 y.o. Gender: female   Dx: <principal problem not specified>  Precautions: suicide risk  Inpatient Status: voluntary       SLEEP:    Sleep Quality Good  Sleep Medications: NO  PRN Sleep Meds: Yes       MEDICAL:    Other PRN Meds: Yes   Med Compliant: Yes  Accu-Chek: No  Oxygen/CPAP/BiPAP: No  CIWA/CINA: No   PAIN Assessment: none  Side Effects from medication: No    Is Patient experiencing any respiratory symptoms (headache, fever, body aches, cough. [employer] ): no  Patient educated by nursing to practice social distancing, wear masks, wash hands frequently: yes      PSYCH:    Depression: 0   Anxiety: 0   SI denies suicidal ideation   HI Negative for homicidal ideation      AVH:Absent      GENERAL:    Appetite: good    Social: Yes   Speech: normal   Appearance: appropriately dressed, appropriately groomed and healthy looking    GROUP:    Group Participation: Yes  Participation Quality: Interactive    Notes: Out of room until HS this shift. At first was isolative to self but, later interacted with peers playing a board game and conversing. During interview states, \" I just want to go home. \"  Denied depression, anxiety, SI, HI, AVH. Good eye contact during interview but evasive and poverty of content regarding circumstances leading to her admission, etc.  Endorses her appetite is good. Refused PRN Melatonin stating, \" that doesn't work on me. \"  Did take Trazodone and Atarax this PM.          Electronically signed by Jessika Cuenca RN on 2/10/21 at 11:58 PM CST

## 2021-02-11 NOTE — PROGRESS NOTES
Group Therapy Note    Start Time: 800  End Time:  369  Number of Participants: 10    Type of Group: Community Meeting       Patient's Goal:  \"going home\"      Notes:      Participation Level:  Active Listener       Participation Quality: Appropriate      Thought Process/Content: Logical      Affective Functioning: Congruent      Mood: calm      Level of consciousness:  Alert      Modes of Intervention: Support      Discipline Responsible: Behavioral Health Tech II      Signature:  Tutu Lowry

## 2021-02-11 NOTE — GROUP NOTE
Group Therapy Note    Date: 2/11/2021    Group Start Time: 1430  Group End Time: 1500  Group Topic: Group Therapy    [facility] ADULT BHI    Oscar Pedraza RN      Date: 02/11/21  Start Time: 14:30  End Time: 15:00    Number of Participants: 6    Type of Group: thumball    Patient's Goal:  answer questions on the ball    Notes:  Participated well    Status After Intervention:  Improved    Participation Level:  Active Listener and Interactive    Participation Quality: Appropriate, Attentive, Sharing, and Supportive    Speech:  normal    Thought Process/Content: Logical  Linear    Affective Functioning: Congruent    Mood: euthymic    Level of consciousness:  Alert, Oriented x4, and Attentive    Response to Learning: Progressing to goal    Modes of Intervention: Education and Support    Discipline Responsible: Registered Nurse    Signature:  Oscar Pedraza RN

## 2021-02-11 NOTE — PROGRESS NOTES
Treatment Team Note:     ALYSSA met with [address] [address] team to discuss Pts TX and DC plans.      Progress/Behavior/Group Attendance: attending group     Target Symptoms/Reason for admission: Patient admitted to [facility] due to im feeling down lately. I have been to therapy twice. She thinks I have a eating disorder. Pt says she eats and then throws up.pt says she weighs herself evry hour. Pt says her main stressor is her weight. My job is really hard and I hate it. I work nights, so im not able to sleep. Pt says she is suicidal with thoughts of overdose. Pt denies being on any medications. I live with my aunt , my mother kicked me out. she picks men over her children. im really insignificant in this world. Pt denies past psych history.  Pt denies avh, hi. Pt says she Is using xanax and adderall that isnt prescribed to her  Diagnoses: Social anxiety disorder, Eating disorder unspecified , Cluster B traits  Stimulant use disorder, Cannabis use disorder, Sedative use , Transaminitis, Elevated alkaline phosphatase  UDS: Benzodiazepine, Cannabinoid, Cocaine  BAL:  Neg     AftercarePlan: [facility] family counseling     Pt lives with: aunt     Collateral obtained from aunt  On:1/10/24     Family Session: RACHELL     Misc:

## 2021-02-11 NOTE — PROGRESS NOTES
Progress Note  Ayesha Bath  2/10/2021 6:36 PM  Subjective:   Admit Date:   2/8/2021      CC/ADMIT DX:       Interval History:   Reviewed overnight events and nursing notes. No new physical complaints. I have reviewed all labs/diagnostics from the last 24hrs. ROS:   I have done a 10 point ROS and all are negative, except what is mentioned in the HPI. DIET GENERAL;    Medications:      vitamin D  50,000 Units Oral Weekly    escitalopram  5 mg Oral Daily           Objective:   Vitals: BP (!) 98/59   Pulse 70   Temp 97.6 °F (36.4 °C) (Temporal)   Resp 18   Ht 5' 6\" (1.676 m)   Wt 164 lb 9.6 oz (74.7 kg)   SpO2 98%   BMI 26.57 kg/m²  No intake or output data in the 24 hours ending 02/10/21 1836  General appearance: alert and cooperative with exam  Extremities: extremities normal, atraumatic, no cyanosis or edema  Neurologic:  No obvious focal neurologic deficits. Skin: no rashes    Assessment and Plan: Active Problems:    Persistent mood (affective) disorder, unspecified (HCC)    Cannabis use disorder, mild, abuse  Resolved Problems:    * No resolved hospital problems. *    Vit D Def    Plan:  1. Continue present medication(s)   2. Replace Vit D  3. Follow with Psych      Discharge planning:   her home     Reviewed treatment plans with the patient and/or family.              Electronically signed by Mercedes Sunshine MD on 2/10/2021 at 6:36 PM

## 2021-02-11 NOTE — PROGRESS NOTES
BHI Daily Shift Assessment  Nursing Progress Note    [facility]    Room: 601   Name: Kip Rubio  Age: 22   Gender: F   Dx: Suicidal thought  Precautions: suicide  Target Symptoms: suicidal thought   Accu-Chek: no  Sleep Quality: 8 hrs  SI: no  HI: no  AVH: no  ADLs: yes  Speech: clear  Depression: 0  Anxiety: 0  Participation Quality: high  Appetite: 75%  Respiratory symptoms: none  Headache: none  Body aches: none  Fever: none  Cough: none  Patients encouraged to wear masks / wash hands frequently / practice social distancing: yes  Visitation: n/a  Complaints: none    Notes: Patient calm and cooperative, appearance clean, thought organized, alert/oriented, activities and self care done.     Signature: Natasha Shah RN

## 2021-02-11 NOTE — GROUP NOTE
Group Therapy Note    Date: 2/11/2021    Group Start Time: 1600  Group End Time: 1700  Group Topic: Healthy Living/Wellness    [facility] 6 ADULT I    Adolfo Rowley RN                Patient's Goal:  Medication education    Status After Intervention:  Improved    Participation Level: Active Listener    Participation Quality: Appropriate      Speech:  normal      Thought Process/Content: Logical  Linear      Affective Functioning: Congruent      Mood: anxious      Level of consciousness:  Alert and Oriented x4      Response to Learning: Able to verbalize current knowledge/experience    Modes of Intervention: Education      Discipline Responsible: Registered Nurse      Signature:   Adolfo Rowley RN

## 2021-02-11 NOTE — BH NOTE
Group Note    Number of Participants in Group: 8  Number of Patients on Unit:12      Patient attended group:Yes  Reason for Absence:  Intervention for patient absence:        Type of Group:   Wrap-Up/Relaxation    Patient's Goal: See wrap up group sheet    Participation Level:     Active Listener, Interactive, Monopolizing, Minimal and None           Patient Response to Learning: Yes    Patient's Behavior: Pleasant    Is Patient Social/Interacting: Yes    Relaxation:   Television:Yes   Reading:No   Game/Puzzle:Yes   Phone: No       Notes/Comments:      Please see patient's wrap up group sheet for patient's comments       Electronically signed by Sera Claire on 2/10/21 at 11:56 PM CST

## 2021-02-12 PROBLEM — R74.8 ELEVATED ALKALINE PHOSPHATASE LEVEL: Chronic | Status: ACTIVE | Noted: 2021-02-12

## 2021-02-12 PROBLEM — F10.10 ALCOHOL USE DISORDER, MILD, ABUSE: Chronic | Status: ACTIVE | Noted: 2021-02-12

## 2021-02-12 PROBLEM — F12.20 CANNABIS USE DISORDER, SEVERE, DEPENDENCE (HCC): Chronic | Status: ACTIVE | Noted: 2021-02-12

## 2021-02-12 PROBLEM — F50.9 EATING DISORDER, UNSPECIFIED: Chronic | Status: ACTIVE | Noted: 2021-02-12

## 2021-02-12 PROBLEM — E55.9 VITAMIN D DEFICIENCY: Chronic | Status: ACTIVE | Noted: 2021-02-12

## 2021-02-12 PROBLEM — F40.10 SOCIAL ANXIETY DISORDER: Chronic | Status: ACTIVE | Noted: 2021-02-12

## 2021-02-12 PROBLEM — F13.10: Chronic | Status: ACTIVE | Noted: 2021-02-12

## 2021-02-12 PROBLEM — F14.10 COCAINE USE DISORDER (HCC): Chronic | Status: ACTIVE | Noted: 2021-02-12

## 2021-02-12 PROCEDURE — 99239 HOSP IP/OBS DSCHRG MGMT >30: CPT | Performed by: PSYCHIATRY & NEUROLOGY

## 2021-02-12 PROCEDURE — 5130000000 HC BRIDGE APPOINTMENT

## 2021-02-12 PROCEDURE — 6370000000 HC RX 637 (ALT 250 FOR IP): Performed by: PSYCHIATRY & NEUROLOGY

## 2021-02-12 RX ORDER — ERGOCALCIFEROL 1.25 MG/1
50000 CAPSULE ORAL WEEKLY
Qty: 11 CAPSULE | Refills: 1 | Status: SHIPPED | OUTPATIENT
Start: 2021-02-17 | End: 2021-04-29

## 2021-02-12 RX ORDER — ESCITALOPRAM OXALATE 5 MG/1
5 TABLET ORAL DAILY
Qty: 30 TABLET | Refills: 1 | Status: SHIPPED | OUTPATIENT
Start: 2021-02-13 | End: 2021-03-15

## 2021-02-12 RX ADMIN — ESCITALOPRAM OXALATE 5 MG: 10 TABLET ORAL at 07:48

## 2021-02-12 NOTE — PROGRESS NOTES
Patient's aunt, Claudette White, states she has filled prescriptions, and is prepared to care for her niece at her home. If psychiatry needs to contact, please call [phone]. Thank you.

## 2021-02-12 NOTE — PROGRESS NOTES
Discharge Note     Pt discharging on this date. Pt denies SI, HI, and AVH at this time. Pt reports improvement in behavior and is leaving unit in overall good condition. SW and pt discussed pt's follow up appointments and importance of attending appointments as scheduled, pt voiced understanding and agreement. Pt and SW also discussed pt safety plan and pt able to verbally identify: warning signs, coping strategies, places and people that help make the pt feel better/distract negative thoughts, friends/family/agencies/professionals the pt can reach out to in a crisis, and something that is important to the pt/worth living for. Pt provided the national suicide prevention hotline number (0-673-652-523-811-7067) as well as local community behavioral health [facility]) crisis number for emergencies (1-509-059-738-687-7515).      Pt to follow up with:  [address] [place] [address], [address] [place] [address],[facility] , [place] with Abad Freeman on 2/17/21 @ 03:00 PM.    Referral to out patient tobacco cessation counseling treatment:  Made at discharge with (company) on (date) at (time)  Patient refused referral to outpatient tobacco cessation counseling    SW offered to assist pt with transportation, pt reports
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
## 2021-02-12 NOTE — PROGRESS NOTES
Progress Note  Jenna Martinez  2/11/2021 8:35 PM  Subjective:   Admit Date:   2/8/2021      CC/ADMIT DX:       Interval History:   Reviewed overnight events and nursing notes. She has no medical issues. I have reviewed all labs/diagnostics from the last 24hrs. ROS:   I have done a 10 point ROS and all are negative, except what is mentioned in the HPI. DIET GENERAL;    Medications:      vitamin D  50,000 Units Oral Weekly    escitalopram  5 mg Oral Daily           Objective:   Vitals: /68   Pulse 68   Temp 97 °F (36.1 °C) (Temporal)   Resp 18   Ht 5' 6\" (1.676 m)   Wt 164 lb 9.6 oz (74.7 kg)   SpO2 98%   BMI 26.57 kg/m²  No intake or output data in the 24 hours ending 02/11/21 2035  General appearance: alert and cooperative with exam  Extremities: extremities normal, atraumatic, no cyanosis or edema  Neurologic:  No obvious focal neurologic deficits. Skin: no rashes    Assessment and Plan: Active Problems:    Persistent mood (affective) disorder, unspecified (HCC)    Cannabis use disorder, mild, abuse  Resolved Problems:    * No resolved hospital problems. *    Vit D Def    Plan:  1. Continue present medication(s)   2. She is medically stable. I will monitor for any changes or concerns. 3.  Follow with Psych      Discharge planning:   her home     Reviewed treatment plans with the patient and/or family.              Electronically signed by Osmel Reyes MD on 2/11/2021 at 8:35 PM

## 2021-02-12 NOTE — PLAN OF CARE
Group Therapy Note    Date: 2/12/2021  Start Time: 1100  End Time:  8356  Number of Participants: 5    Type of Group: Psychoeducation    Wellness Binder Information  Module Name:  Women's Issues  Session Number:  4    Group Goal for Pt: To raise awareness of how thoughts influence feelings    Notes:  Pt demonstrated improved awareness of how thoughts influence feelings by actively participating in group discussion.     Status After Intervention:  Unchanged    Participation Level: Minimal    Participation Quality: Resistant      Speech:  normal      Thought Process/Content: Logical      Affective Functioning: Congruent      Mood: anxious and depressed      Level of consciousness:  Alert and Oriented x4      Response to Learning: Able to verbalize current knowledge/experience, Able to verbalize/acknowledge new learning, and Progressing to goal      Endings: None Reported    Modes of Intervention: Education      Discipline Responsible: Psychoeducational Specialist      Signature:  Francia Duron

## 2021-02-12 NOTE — PROGRESS NOTES
Signed             Show:Clear all  [x]Manual[]Template[x]Copied    Added by:  [x]Shaunna Goss    []Amy for details  Treatment Team Note:     SW met with [address] team to discuss Pts TX and DC plans.      Progress/Behavior/Group Attendance: attending group     Target Symptoms/Reason for admission: Patient admitted to [facility] due to im feeling down lately. I have been to therapy twice. She thinks I have a eating disorder. Pt says she eats and then throws up.pt says she weighs herself evry hour. Pt says her main stressor is her weight. My job is really hard and I hate it. I work nights, so im not able to sleep. Pt says she is suicidal with thoughts of overdose. Pt denies being on any medications. I live with my aunt , my mother kicked me out. she picks men over her children. im really insignificant in this world. Pt denies past psych history.  Pt denies avh, hi. Pt says she Is using xanax and adderall that isnt prescribed to her  Diagnoses: Social anxiety disorder, Eating disorder unspecified , Cluster B traits  Stimulant use disorder, Cannabis use disorder, Sedative use , Transaminitis, Elevated alkaline phosphatase  UDS: Benzodiazepine, Cannabinoid, Cocaine  BAL:  Neg     AftercarePlan: [facility]     Pt lives with: aunt     Collateral obtained from aunt  On:2/9/21     Family Session: RACHELL     Misc:

## 2021-02-12 NOTE — PROGRESS NOTES
[facility] Adult Unit Daily Assessment  Nursing Progress Note    Room: [facility]/601-01   Name: Cuate Milner   Age: 22 y.o. Gender: female   Dx: <principal problem not specified>  Precautions: suicide risk  Inpatient Status: voluntary       SLEEP:    Sleep Quality Good  Sleep Medications: No   PRN Sleep Meds: Yes       MEDICAL:    Other PRN Meds: No   Med Compliant: Yes  Accu-Chek: No  Oxygen/CPAP/BiPAP: No  CIWA/CINA: No   PAIN Assessment: none  Side Effects from medication: No    Is Patient experiencing any respiratory symptoms (headache, fever, body aches, cough. Jon Hargrove ): no  Patient educated by nursing to practice social distancing, wear masks, wash hands frequently: yes      PSYCH:    Depression: 0   Anxiety: 0   SI denies suicidal ideation   HI Negative for homicidal ideation      AVH:Absent      GENERAL:    Appetite: good    Social: Yes   Speech: normal   Appearance: appropriately dressed, appropriately groomed, good hygiene and healthy looking    GROUP:    Group Participation: Yes  Participation Quality: Interactive    Notes:     Patient is alert, oriented, calm and cooperative with staff and peers. patient has good eye contact during interview and is excited about getting to go home tomorrow. Patient denies any depression or anxiety and states that she thinks she is ready for discharge. Patient did complain of her wisdom teeth trying to come in. No obvious s/s of distress voiced or noted. Will continue to monitor.      Electronically signed by Glynn Cabot, RN on 2/11/21 at 9:56 PM CST

## 2021-02-12 NOTE — PROGRESS NOTES
ALYSSA obtained follow-up collateral from pts' [address] [phone]. Collateral reports all medications have been removed from the home. Aunt will be staying with pt for a few days to ensure stability. No additional concerns from family at this time.  Family will provide transportation for discharge

## 2021-02-12 NOTE — PROGRESS NOTES
5 [facility]  Discharge Note  Bridge Appointment completed: Reviewed Discharge Instructions with patient. Patient verbalizes understanding and agreement with the discharge plan using the teachback method.      Referral for Outpatient Tobacco Cessation Counseling, upon discharge (floresita X if applicable and completed):    ( )  Hospital staff assisted patient to call Quit Line or faxed referral                                   during hospitalization                  ( )  Recognizing danger situations (included triggers and roadblocks), if not completed on admission                    ( )  Coping skills (new ways to manage stress, exercise, relaxation techniques, changing routine, distraction), if not completed on admission                                                           ( )  Basic information about quitting (benefits of quitting, techniques in how to quit, available resources, if not completed on admission  ( ) Referral for counseling faxed to [facility]   ( ) Patient refused referral  ( ) Patient refused counseling  (x ) Patient refused smoking cessation medication upon discharge    Vaccinations (floresita X if applicable and completed):  ( ) Patient states already received influenza vaccine elsewhere  ( ) Patient received influenza vaccine during this hospitalization  (x ) Patient refused influenza vaccine at this time      Pt discharged with followings belongings:   Dentures: None  Vision - Corrective Lenses: None  Hearing Aid: None  Jewelry: None  Body Piercings Removed: No  Clothing: None  Were All Patient Medications Collected?: No  Other Valuables: None Valuables sent home with patient. Valuables retrieved from safe and returned to patient yes. Patient left department with  family via  car, discharged to  self care. Patient education on aftercare instructions: yes  Patient verbalize understanding of AVS: yes. Suicidal Ideations? No AVH? none HI? Negative for homicidal ideation       Status EXAM upon discharge:  Status and Exam  Normal: Yes  Facial Expression: Brightened  Affect: Appropriate  Level of Consciousness: Alert  Mood:Normal: Yes  Mood: Depressed, Anxious, Sad, Helpless  Motor Activity:Normal: Yes  Motor Activity: Decreased  Interview Behavior: Cooperative  Preception: [place] to Person, [place] to Time, [place] to Place, [place] to Situation  Attention:Normal: Yes  Attention: Unable to Concentrate  Thought Processes: Circumstantial  Thought Content:Normal: Yes  Thought Content: Poverty of Content  Hallucinations: None  Delusions: No  Memory:Normal: Yes  Insight and Judgment: Yes  Insight and Judgment: Poor Insight  Present Suicidal Ideation: No  Present Homicidal Ideation: No     Patient calm and cooperative, states readiness to leave.

## 2021-02-13 NOTE — PROGRESS NOTES
Progress Note  Vidya Torres  2/12/2021 10:05 PM  Subjective:   Admit Date:   2/8/2021      CC/ADMIT DX:       Interval History:   Reviewed overnight events and nursing notes. She has no new physical complaints. I have reviewed all labs/diagnostics from the last 24hrs. ROS:   I have done a 10 point ROS and all are negative, except what is mentioned in the HPI. No diet orders on file    Medications:             Objective:   Vitals: /61   Pulse 82   Temp 98.5 °F (36.9 °C) (Temporal)   Resp 20   Ht 5' 6\" (1.676 m)   Wt 164 lb 9.6 oz (74.7 kg)   SpO2 99%   BMI 26.57 kg/m²  No intake or output data in the 24 hours ending 02/12/21 2205  General appearance: alert and cooperative with exam  Extremities: extremities normal, atraumatic, no cyanosis or edema  Neurologic:  No obvious focal neurologic deficits. Skin: no rashes    Assessment and Plan:   Principal Problem:    Persistent mood (affective) disorder, unspecified (HCC)  Active Problems:    Cocaine use disorder (HCC)    Sedative, hypnotic or anxiolytic use disorder, mild, abuse (HCC)    Alcohol use disorder, mild, abuse    Cannabis use disorder, severe, dependence (HCC)    Eating disorder, unspecified    Social anxiety disorder    Elevated alkaline phosphatase level    Vitamin D deficiency  Resolved Problems:    Cannabis use disorder, mild, abuse    Vit D Def    Plan:  1. Continue present medication(s)   2. She remains medically stable. I will monitor for any new changes or concerns. 3.  Follow with Psych      Discharge planning:   her home     Reviewed treatment plans with the patient and/or family.              Electronically signed by Ludivina Prasad MD on 2/12/2021 at 10:05 PM

## 2021-02-13 NOTE — PROGRESS NOTES
SW attempted to contact pt to follow-up with her after she discharged from the unit yesterday to see if she had any questions or concerns that needed to be addressed. ALYSSA called the contact number listed for the pt and it said \"the wireless customer you are calling isn't available, please try again later. \"

## 2021-02-16 VITALS
SYSTOLIC BLOOD PRESSURE: 105 MMHG | TEMPERATURE: 98.5 F | RESPIRATION RATE: 20 BRPM | DIASTOLIC BLOOD PRESSURE: 61 MMHG | OXYGEN SATURATION: 99 % | HEART RATE: 82 BPM | BODY MASS INDEX: 26.45 KG/M2 | WEIGHT: 164.6 LBS | HEIGHT: 66 IN

## 2022-12-28 NOTE — PROGRESS NOTES
Department of Psychiatry  Attending Progress Note     Chief complaint: \"I'm ready to go home! \"    SUBJECTIVE:   Chart reviewed, discussed with the team.  No major issues overnight. Patient was more social last night. Played a game with peers. Remains evasive during interviews with the staff. Took trazodone and Atarax. Patient is observed interacting with peers this morning. She presents with superficially bright affect and states \"I'm ready to go! \"  The writer attempted to explore her thought content, however, patient was guarded. When asked how she will keep herself safe at home, states \"I will go home. I will eat food. I will not use drugs. \"  The writer asked why patient switched her insurance beneficiary. Patient replied that she does not remember doing it. Later stated \"Yes, I think I did it. I don't remember why. \"  She reports good sleep and appetite. No side effects on current medication regimen. Denies depression and anxiety. Unable to tell me what she learned in groups yesterday. OBJECTIVE    Physical  Wt Readings from Last 3 Encounters:   02/08/21 164 lb 9.6 oz (74.7 kg)     Temp Readings from Last 3 Encounters:   02/10/21 97.6 °F (36.4 °C) (Temporal)     BP Readings from Last 3 Encounters:   02/10/21 (!) 98/59     Pulse Readings from Last 3 Encounters:   02/10/21 70        Review of Systems: 14-point review of systems negative except as described above    Mental Status Examination:   Appearance: Stated age. Long hair, glasses. Gait stable. No abnormal movements or tremor. Behavior: Calm, cooperative, smiled superficially  Speech: Normal in tone, volume, and quality. No slurring, dysarthria or pressured speech noted. Mood: \"Ready to go! \"   Affect: Superficially bright  Thought Process: Appears linear. Thought Content: Denies suicidal or homicidal ideation. No overt delusions or paranoia appreciated. Perceptions: Denies auditory or visual hallucinations at present time. Not responding to internal stimuli. Concentration: Intact. Orientation: to person, place, date, and situation. Language: Intact. Fund of information: Intact. Memory: Recent and remote appear intact. Neurovegitative: Fair appetite and sleep. Insight: Limited. Judgment: Limited.     Data  Lab Results   Component Value Date    WBC 6.2 02/08/2021    HGB 12.9 02/08/2021    HCT 38.8 02/08/2021    MCV 86.2 02/08/2021     02/08/2021      Lab Results   Component Value Date     02/10/2021    K 4.1 02/10/2021     02/10/2021    CO2 26 02/10/2021    BUN 7 02/10/2021    CREATININE 0.5 02/10/2021    GLUCOSE 81 02/10/2021    CALCIUM 8.9 02/10/2021    PROT 6.9 02/10/2021    LABALBU 4.1 02/10/2021    BILITOT 0.5 02/10/2021    ALKPHOS 116 (H) 02/10/2021    AST 26 02/10/2021    ALT 36 (H) 02/10/2021    LABGLOM >60 02/10/2021    GFRAA >59 02/10/2021       Medications    Current Facility-Administered Medications:     vitamin D (ERGOCALCIFEROL) capsule 50,000 Units, 50,000 Units, Oral, Weekly, Reza Simon MD, 50,000 Units at 02/10/21 1017    escitalopram (LEXAPRO) tablet 5 mg, 5 mg, Oral, Daily, Reza Simon MD, 5 mg at 02/11/21 0801    traZODone (DESYREL) tablet 50 mg, 50 mg, Oral, Nightly PRN, Reza Simon MD, 50 mg at 02/10/21 2150    hydrOXYzine (ATARAX) tablet 25 mg, 25 mg, Oral, TID PRN, Reza Simon MD, 25 mg at 02/10/21 2150    acetaminophen (TYLENOL) tablet 650 mg, 650 mg, Oral, Q4H PRN, Wilver Hill MD, 650 mg at 02/10/21 1048    polyethylene glycol (GLYCOLAX) packet 17 g, 17 g, Oral, Daily PRN, Wilver Hill MD    melatonin disintegrating tablet 5 mg, 5 mg, Oral, Nightly PRN, Wilver Hill MD, 5 mg at 02/09/21 2119    ASSESSMENT AND PLAN  DSM 5 DIAGNOSIS  Impression  Social anxiety disorder  Eating disorder unspecified   Cluster B traits  Alcohol use disorder  Stimulant use disorder Cannabis use disorder  Sedative use disorder  Elevated alkaline phosphatase  Vitamin D deficiency    Superficially bright and childlike. Insight and judgement remain poor. We will get  In touch with the aunt today to discuss treatment plan. Plan:   1. Psychiatric Medications:   Continue current psychotropic medications as recommended. Monitor for side effects. The risks, benefits, side effects, indications, contraindications, alternatives and adverse effects of the medications have been discussed with patient. 2. Continue to provide supportive psychotherapy. Encourage socialization and participation in recreational activities. Work on coping skills. 3. Medical Issues:    Continue medical monitoring by Dr. Sami Harrsi and associates. Vitamin supplementation. 4. Disposition:     to provide outpatient resources and facilitate disposition.      Amount of time spent with patient:      35 minutes with greater than 50 % of the time spent in counseling and collaboration of care Xray Knee 3 Views, Left